# Patient Record
Sex: MALE | Race: WHITE
[De-identification: names, ages, dates, MRNs, and addresses within clinical notes are randomized per-mention and may not be internally consistent; named-entity substitution may affect disease eponyms.]

---

## 2019-09-19 ENCOUNTER — HOSPITAL ENCOUNTER (EMERGENCY)
Dept: HOSPITAL 25 - UCEAST | Age: 44
Discharge: HOME | End: 2019-09-19
Payer: COMMERCIAL

## 2019-09-19 VITALS — DIASTOLIC BLOOD PRESSURE: 85 MMHG | SYSTOLIC BLOOD PRESSURE: 144 MMHG
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
## 2019-09-19 DIAGNOSIS — S80.12XA: Primary | ICD-10-CM

## 2019-09-19 PROCEDURE — 99212 OFFICE O/P EST SF 10 MIN: CPT

## 2019-09-19 PROCEDURE — G0463 HOSPITAL OUTPT CLINIC VISIT: HCPCS

## 2019-09-19 NOTE — UC
Lower Extremity/Ankle HPI





- HPI Summary


HPI Summary: 





44-year-old male who was working approximately 10 feet above the ground on a 

ladder when the ladder started falling and as he was falling the ladder hit his 

left lateral lower leg.  He has no other injury.  Denies hitting his head, no 

neck pain.  It was a day before he was leaving on vacation so he did not get 

seen.  After about 2 weeks the swelling and bruising resolved.  About the past 

week he developed swelling again to the distal lateral third of his left lower 

leg.  And ambulatory without difficulty.





- History of Current Complaint


Chief Complaint: UCLowerExtremity


Stated Complaint: LEG COMPLAINT


Time Seen by Provider: 09/19/19 20:21


Hx Obtained From: Patient


Onset/Duration: Sudden Onset, Lasting Weeks


Severity Initially: Moderate


Severity Currently: Mild


Pain Intensity: 0


Aggravating Factor(s): Nothing


Alleviating Factor(s): Nothing


Able to Bear Weight: Yes





- Allergies/Home Medications


Allergies/Adverse Reactions: 


 Allergies











Allergy/AdvReac Type Severity Reaction Status Date / Time


 


No Known Allergies Allergy   Verified 09/19/19 20:29














PMH/Surg Hx/FS Hx/Imm Hx


Previously Healthy: Yes





- Surgical History


Surgical History: None





- Family History


Known Family History: Positive: Non-Contributory





- Social History


Occupation: Employed Full-time


Lives: With Family


Alcohol Use: None


Substance Use Type: None


Smoking Status (MU): Never Smoked Tobacco





Review of Systems


All Other Systems Reviewed And Are Negative: Yes


Skin: Positive: Bruising - Patient initially had a lot of bruising and swelling 

one month ago which resolved.


Musculoskeletal: Positive: Edema - The patient developed left lower leg 

swelling and mild ankle swelling over the past week.  He also has a lump on the 

lateral aspect distal third of his left lower leg., Other:


Is Patient Immunocompromised?: No





Physical Exam


Triage Information Reviewed: Yes


Appearance: Well-Appearing, No Pain Distress, Well-Nourished


Vital Signs: 


 Initial Vital Signs











Temp  98.0 F   09/19/19 20:31


 


Pulse  53   09/19/19 20:31


 


Resp  18   09/19/19 20:31


 


BP  144/85   09/19/19 20:31


 


Pulse Ox  99   09/19/19 20:31











Vital Signs Reviewed: Yes


Musculoskeletal: Positive: Strength Intact, ROM Intact, Edema @ - Patient has 

mild edema of the left lower leg and ankle.  The ankle itself is nontender.  

Patient has a mild palpable lump to the left lateral distal leg which is mildly 

tender on palpation.  No erythema or bruising present.


Neurological: Positive: Alert, Muscle Tone Normal


Psychological Exam: Normal


Skin Exam: Normal





Lower Extremity Course/Dx





- Course


Course Of Treatment: 





Left tib-fib x-ray: Negative Tib-fib as read by myself and Dr. Fermin.





At this point time I advised patient to elevate his leg as much as possible.  A 

4 inch Ace was applied.  He is to apply warm moist heat to the area take 

Tylenol every 4 hours and Motrin every 8 hours as needed for pain.  Definite 

follow-up to the orthopedist if no improvement by Monday or Tuesday.  Patient 

is agreeable to this plan of action.





- Differential Dx/Diagnosis


Provider Diagnosis: 


 Contusion of left lower leg








Discharge ED





- Sign-Out/Discharge


Documenting (check all that apply): Patient Departure


All imaging exams completed and their final reports reviewed: No





- Discharge Plan


Condition: Good


Disposition: HOME


Patient Education Materials:  Contusion in Adults (ED)


Referrals: 


Alma Eckert MD [Medical Doctor] - 


Delfina Strickland MD [Primary Care Provider] - 


Additional Instructions: 


Elevate as much as possible, apply moist heat to the sore area.  Wear the Ace 

bandage for comfort.  Follow-up with the orthopedist if no improvement in 4 or 

5 days or if worsening symptoms.





- Billing Disposition and Condition


Condition: GOOD


Disposition: Home





- Attestation Statements


Provider Attestation: 





I was available for consult. This patient was seen by the SANJAY. The patient was 

not presented to, seen by, or examined by me. -Teena

## 2019-09-19 NOTE — XMS REPORT
Continuity of Care Document (CCD)

 Created on:2019



Patient:Chavo Mckeon

Sex:Male

:1975

External Reference #:[id number]





Demographics







 Address  [address] [place] [address]

 

 Mobile Phone  [phone]

 

 Email Address  [email or web address]

 

 Preferred Language  en

 

 Marital Status  Not  or 

 

 Zoroastrian Affiliation  Unknown

 

 Race  White

 

 Ethnic Group  Not  or 









Author







 Name  Karrie Camilo DNP, RN, FNP-BC (transmitted by agent of provider Tangela Truong)

 

 Address  [address], [address]



   [place] [address]









Care Team Providers







 Name  Role  Phone

 

 Estelle Gamble MD - Family  Care Team Information   [phone]



 Medicine    









Problems







 Active Problems  Provider  Date

 

 Strain of muscle(s) and tendon(s) of the rotator  Julio Reeves MD  Onset: 



 cuff of left shoulder, subsequent encounter    

 

 Lateral epicondylitis  Julio Reeves MD  Onset: 2017

 

 Injury of shoulder region  Julio Reeves MD  Onset: 2017







Social History







 Type  Date  Description  Comments

 

 Birth Sex    Unknown  

 

 Tobacco Use  Start: Unknown End:  Former Cigarette Smoker  Smoked 2-3 ppd for



   Unknown    10 years

 

 Smoking Status  Reviewed: 19  Former Cigarette Smoker  Smoked 2-3 ppd for



       10 years

 

 ETOH Use    Denies alcohol use  

 

 Tobacco Use  Start: Unknown End:  Patient is a former  



   Unknown  smoker  

 

 Recreational Drug Use    Denies Drug Use  

 

 Exercise Type/Frequency    Exercises sporadically  







Allergies, Adverse Reactions, Alerts







 Active Allergies  Reaction  Severity  Comments  Date

 

 Cortisone        2018









 Inactive Allergies









 NKDA        2017







Medications







 Active Medications  SIG  Qnty  Indications  Ordering Provider  Date

 

 Naproxen Sodium  1 or 2 tabs by      Unknown  



              220mg  mouth as needed        



 Capsules          



           

 

 Simvastatin  Take 1 Tablet By      Unknown  



          40mg Tablets  Mouth Every Day        



           

 

 Fish Oil Extra  taking 1 cap by      Unknown  



 Strength  mouth twice daily        



       1200mg Capsules          



           

 

 Vitamin D3  1 by mouth every      Unknown  



         5000Unit  day        



 Capsules          



           

 

 Glucosamine  1 by mouth twice      Unknown  



 Chondroitin 1500  a day        



 Complex          



      1500Com Capsules          



           

 

 Biotin  qd      Unknown  



     2500mcg          



           

 

 Claritin  once daily      Unknown  



       10mg Tablets          



           







Medications Administered in Office







 Medication  SIG  Qnty  Indications  Ordering Provider  Date

 

 Triamcinolone (Kenalog)        Julio Reeves MD  2017



          Injection          

 

 Triamcinolone (Kenalog)        Julio Reeves MD  2017



          Injection          







Immunizations







 Description

 

 No Information Available







Vital Signs







 Date  Vital  Result  Comment

 

 2019  9:20am  Height  73 inches  6'1"









 Weight  257.00 lb  

 

 Heart Rate  54 /min  

 

 BP Systolic Sitting  130 mmHg  Lue large cuff

 

 BP Diastolic Sitting  84 mmHg  Lue large cuff

 

 Respiratory Rate  12 /min  

 

 O2 % BldC Oximetry  97 %  

 

 BMI (Body Mass Index)  33.9 kg/m2  









 2018  9:17am  Height  73 inches  6'1"









 Weight  261.00 lb  

 

 Heart Rate  60 /min  

 

 BP Systolic Sitting  122 mmHg  

 

 BP Diastolic Sitting  86 mmHg  

 

 Respiratory Rate  14 /min  

 

 O2 % BldC Oximetry  97 %  

 

 BMI (Body Mass Index)  34.4 kg/m2  

 

 Neck Circumference in inches  18.25  







Results







 Description

 

 No Information Available







Procedures







 Description

 

 No Information Available







Medical Devices







 Description

 

 No Information Available







Encounters







 Description

 

 No Information Available







Assessments







 Date  Code  Description  Provider

 

 2019  G47.33  Obstructive sleep apnea (adult)  Karrie Camilo DNP, RN, 
FNP-BC



     (pediatric)  

 

 2019  G47.14  Hypersomnia due to medical  Karrie Camilo DNP, RN, FNP-
BC



     condition  







Plan of Treatment

2019 - Karrie Camilo DNP, RN, FNP-BCG47.33 Obstructive sleep apnea (
adult) (pediatric)Comments:On CPAP AHI 2.1/hour, normalFollow up:1 
yearRecommendations:Continue PAP device, Benefitting and compliant with 
treatment.  Cleaning Wipe off mask daily (baby wipe-no scent, or warm water) 
Clean mask, tubing, filter, and water chamber weekly in mild no scent dish soap 
and water. Hang to dry.    If you have any sleepiness while driving you MUST 
avoid operating a vehicle or machinery. If you have difficulty with your 
equipment, or need to replace your mask or hoses, please contact your homecare 
agency. A weight change of 20 pounds or more may have an effect onyour equipment
; if you are experiencing problems please call for an appointment.  If you have 
any further questions, please call the [facility] at [phone]
[phone].G47.14 Hypersomnia due to medical conditionRecommendations:most likely 
related to need for more sleep Recommend you slowly expand your sleep time to 7 
hours/night.



Functional Status







 Description

 

 No Information Available







Mental Status







 Description

 

 No Information Available







Referrals







 Description

 

 No Information Available

## 2019-09-20 NOTE — UC
- Progress Note


Progress Note: 





Patient Name:         JOHANN BURNS                                          

                        Medical Record#: [id number]


Ordering Physician: Ela Tucker NP                                         

                        Acct.#: [id number]


:     1975         Age: 44   Sex: M                                   

                        Location: [facility]


Exam Date: 19                                                       

                        ADM Status: [facility] ER


Order Information:                         TIBIA FIBULA LEFT


Accession Number:                          [id number]


CPT:                                       32370


INDICATION:  Left lower leg injury.





TECHNIQUE: 2 views of the left lower leg were obtained.





FINDINGS: There is anterior and lateral soft tissue swelling. The bones are 

normal


alignment. No fracture is seen.





IMPRESSION:  SOFT TISSUE SWELLING, NO FRACTURE IS SEEN.





R0








Preliminary Imaging Read 


R0                                                                         





____________________________________________________________


<Electronically signed by Mohinder Gunter MD in OV>  19


Dictated By: Mohinder Gunter MD


Dictated Date/Time: 19


Transcribed Date/Time: 19


Copy to:











CC:Ela Tucker NP; Aleida Fermin MD; Delfina Strickland MD


[facility]                                 Imaging - [facility] 


[address]                                       [address] [address]


ph ([phone])                                     ph ([phone])        

                                         ([phone]) 









































This report is only to be considered final once signed by the Provider(s) as 

displayed in the "<Electronically Signed by >" field (s). Absence of a 


signature indicates the report is in a draft status and still needs to be 

finalized. In the event this document was created by someone other than the 


signing Provider, the individual initiating the document will be listed in the 

"Entered by:" or "Dictated by:" fields.


                                                                 1 of 1








Course/Dx





- Diagnoses


Provider Diagnoses: 


 Contusion of left lower leg








Discharge ED





- Sign-Out/Discharge


Documenting (check all that apply): Post-Discharge Follow Up


All imaging exams completed and their final reports reviewed: Yes





- Discharge Plan


Condition: Good


Disposition: HOME


Patient Education Materials:  Contusion in Adults (ED)


Referrals: 


Alma Eckert MD [Medical Doctor] - 


Delfina Strickland MD [Primary Care Provider] - 


Additional Instructions: 


Elevate as much as possible, apply moist heat to the sore area.  Wear the Ace 

bandage for comfort.  Follow-up with the orthopedist if no improvement in 4 or 

5 days or if worsening symptoms.





- Billing Disposition and Condition


Condition: GOOD


Disposition: Home